# Patient Record
Sex: MALE
[De-identification: names, ages, dates, MRNs, and addresses within clinical notes are randomized per-mention and may not be internally consistent; named-entity substitution may affect disease eponyms.]

---

## 2024-08-19 PROBLEM — Z00.00 ENCOUNTER FOR PREVENTIVE HEALTH EXAMINATION: Status: ACTIVE | Noted: 2024-08-19

## 2024-08-20 ENCOUNTER — APPOINTMENT (OUTPATIENT)
Dept: SURGERY | Facility: CLINIC | Age: 24
End: 2024-08-20

## 2024-08-21 ENCOUNTER — APPOINTMENT (OUTPATIENT)
Dept: SURGERY | Facility: CLINIC | Age: 24
End: 2024-08-21
Payer: COMMERCIAL

## 2024-08-21 VITALS
DIASTOLIC BLOOD PRESSURE: 74 MMHG | SYSTOLIC BLOOD PRESSURE: 119 MMHG | BODY MASS INDEX: 21.19 KG/M2 | OXYGEN SATURATION: 98 % | HEIGHT: 67 IN | WEIGHT: 135 LBS | HEART RATE: 85 BPM

## 2024-08-21 DIAGNOSIS — Z78.9 OTHER SPECIFIED HEALTH STATUS: ICD-10-CM

## 2024-08-21 DIAGNOSIS — Z83.3 FAMILY HISTORY OF DIABETES MELLITUS: ICD-10-CM

## 2024-08-21 DIAGNOSIS — R10.9 UNSPECIFIED ABDOMINAL PAIN: ICD-10-CM

## 2024-08-21 DIAGNOSIS — K82.8 OTHER SPECIFIED DISEASES OF GALLBLADDER: ICD-10-CM

## 2024-08-21 DIAGNOSIS — Z82.49 FAMILY HISTORY OF ISCHEMIC HEART DISEASE AND OTHER DISEASES OF THE CIRCULATORY SYSTEM: ICD-10-CM

## 2024-08-21 PROCEDURE — 99204 OFFICE O/P NEW MOD 45 MIN: CPT

## 2024-08-21 RX ORDER — AMITRIPTYLINE HYDROCHLORIDE 75 MG/1
TABLET, FILM COATED ORAL
Refills: 0 | Status: ACTIVE | COMMUNITY

## 2024-08-21 NOTE — CONSULT LETTER
[Dear  ___] : Dear  [unfilled], [Consult Letter:] : I had the pleasure of evaluating your patient, [unfilled]. [Consult Closing:] : Thank you very much for allowing me to participate in the care of this patient.  If you have any questions, please do not hesitate to contact me. [Sincerely,] : Sincerely, [FreeTextEntry3] : Austyn Schofield MD Director, General Surgery

## 2024-08-21 NOTE — DATA REVIEWED
[FreeTextEntry1] : 11/20/23 @[facility] HEPATOBILIARY SCAN WITH GBEF [id number] IMPRESSION: 1.  Nondiagnostic study for sphincter of Oddi dysfunction due to (1) gallbladder activity obscuring the common bile duct and (2) presence of a gallbladder. 2.  Patent cystic duct.     FINDINGS: NM HEPATOBILIARY SCAN WITH GBEF Agent: 5.0 mCi Choletec, IV. 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   Technique: The patient was pre-treated with CCK per protocol.  The patient was subsequently injected with the above radiopharmaceutical.  Dynamic imaging of the liver was performed over 60 minutes. Comparison  None. Findings: Initial perfusion images of the liver are normal. Subsequent dynamic imaging demonstrates rapid clearance from the blood pool and brisk hepatic uptake. The gallbladder is visualized at 7 minutes and fills a normal fashion. Bowel activity is noted at 53 minutes. The gallbladder obscures visualization of the common bile duct.    US ABDOMEN COMPLETE: 4/4/2023 8:45 AM CLINICAL INFORMATION:  Abdominal pain not otherwise specified TECHNIQUE: Ultrasonography of the abdomen was performed using a linear array transducer.  COMPARISON:None  FINDINGS:   The liver measures 13.3cm in length. Hepatic contour is unremarkable. Hepatic echotexture is unremarkable, without evidence for focal lesion. The portal vein is patent with appropriate direction flow.  No intrahepatic biliary ductal dilatation.   The common duct is not dilated, measuring 3 mm.    No mobile gallstones or sludge . There is no gallbladder wall thickening. No pericholecystic collection.  No tenderness was elicited with direct compression by the transducer (no sonographic An's sign identified). The visualized portions of the pancreas appear unremarkable, allowing for the limited evaluation by the ultrasound technique.   The right kidney measures  9.3 cm in length. Cortical thickness and echogenicity are preserved. No  obstructing calculus. No solid mass or hydronephrosis. The left kidney measures  10.0 cm in length. Cortical thickness and echogenicity are preserved. No  obstructing calculus. No solid mass or hydronephrosis. The spleen measures approximately 9.0  cm. Echotexture is preserved. No focal mass.    XR ABDOMEN 1 VIEW : 4/4/2023 8:45 AM History: Abdominal pain. Technique: A supine, AP radiograph of the abdomen and pelvis was performed. Comparison: None available.  Findings: The bowel gas pattern is nonobstructive, given the limitations on this single supine view. There is a large amount of stool throughout the right, transverse, and left colon and stool and bowel gas in the distal colon. The osseous structures are unremarkable. Impression: Findings compatible with constipation.      Patient: REJI MUNOZ YOB: 2000 Phone: [phone]  MRN: [id number] Acc: [id number] Date of Exam: 12-   EXAM:  MRI ABDOMEN WITHOUT AND WITH CONTRAST AND MRI PELVIS WITHOUT AND WITH CONTRAST  HISTORY:  Right upper quadrant abdominal pain with bloating, negative upper endoscopy, rule out small bowel process  TECHNIQUE:  MRI enterography is attained utilizing 1.5 Sari magnet.  Before the examination, the patient drank 3 bottles of Breeza.  Axial and coronal true FISP imaging and axial and coronal HASTE with fat saturated imaging is provided.  Coronal HASTE without fat saturated and coronal VIBE fat-saturated imaging is also obtained. Then 1 mg of glucagon was administered.  Postcontrast VIBE 3-D is then acquired in coronal plane after the intravenous administration of contrast. 12 ml of Clariscan was injected from a 15 ml single use vial. Subtraction views are provided. COMPARISON:  CT abdomen and pelvis from 2021  FINDINGS: GASTROINTESTINAL: There is adequate fluid distention of the small bowel. There is no evidence for bowel wall thickening, mural hyperenhancement, or mural edema. There is no evidence for stricture or obstruction. No signs of penetrating disease large field-of-view imaging does not demonstrate perianal fistula or abscess. No discrete small bowel mass is visualized. The colon is grossly unremarkable. LOWER CHEST: Unremarkable. HEPATOBILIARY: No evidence for cholelithiasis or choledocholithiasis. No evidence for biliary irregularity of. SPLEEN: Unremarkable. ADRENAL GLANDS: Unremarkable. PANCREAS: No signs of acute or chronic pancreatitis. Normal caliber main pancreatic duct with GENITOURINARY: Unremarkable. PERITONEUM AND RETROPERITONEUM: No abnormal free fluid and no discrete collection. LYMPH NODES:  Unremarkable. VASCULATURE: Unremarkable. SOFT TISSUES: Unremarkable. BONES: Unremarkable. IMPRESSION: 1.  No active inflammatory small bowel disease. 2.  No stricture, penetrating disease, or perianal disease. 3.  No extraintestinal sequela of inflammatory bowel disease.  Patient: REJI MUNOZ YOB: 2000 Phone: [phone]  MRN: [id number] Acc: [id number] Date of Exam: 10-   EXAM:  CT ABDOMEN AND PELVIS WITH CONTRAST  HISTORY:  Weight loss. TECHNIQUE:  Helical scanning was performed from the diaphragm domes to the pubic symphysis with intravenous contrast. 100 cc of Omnipaque 350 was administered intravenously. One or more of the following dose reduction techniques were used: automated exposure control, adjustment of the mA and/or kV according to patient size, use of iterative reconstruction technique.  COMPARISON:  None.  FINDINGS: The lung bases are clear. The liver is normal in size without evidence of focal masses or intrahepatic biliary dilatation. There is no evidence of gallstone, wall thickening or pericholecystic fluid. The spleen is normal in size without evidence of mass. There is no evidence of pancreatic mass or ductal dilatation. No evidence of urinary tract calculus. Postcontrast imaging demonstrates the kidneys to enhance and excrete contrast symmetrically without evidence of solid focal masses. There is no evidence of hydronephrosis. There is no bowel related phlegmon, mural thickening or obstruction. There is no adrenal mass. There is no significant retroperitoneal lymphadenopathy. There is no aortic aneurysm. The urinary bladder demonstrates no evidence of mucosal abnormality or wall thickening. No pelvic lymphadenopathy, mass, or ascites is observed. No suspicious skeletal lesions are observed.  IMPRESSION:  1.  Normal appearance of the abdomen and pelvis.

## 2024-08-21 NOTE — HISTORY OF PRESENT ILLNESS
[de-identified] : Mr. MUNOZ is a 24 year y/o M referred (by Norma Azar) for consultation visit w cc of having epigastric abdominal pain and bloating, which is intermittent over the past 3 years.  Pt has been experiencing upper abdominal pain a/w nausea. He also notes pain, usually after eating his first meal of the day. Does not have pain after eating subsequent meals.  No vomiting. No prior history of hospitalizations. No fevers or chills. Pt has history of gastritis, and has taken PPI /antacids in the past, w no symptomatic relief. Pt avoids spicy foods/alcoholic beverages.    Patient has undergone an extensive workup as follows:   Studies/IMAGING Hx:  Pt had Abd US, 04/04/23; findings were within normal limits, etiology for symptoms not determined.  XRAY of Abdomen, 04/04/23: findings showed bowel gas pattern The bowel gas pattern is nonobstructive, given the limitations on this single supine view. There is a large amount of stool throughout the right, transverse, and left colon and stool and bowel gas in the distal colon. nonobstructive, given the limitations on this single supine view. There is a large amount of stool throughout the right, transverse, and left colon and stool and bowel gas in the distal colon.  HIDA with biliary EF (10/2023)-- findings showed gallbladder EF of 25%, below normal range. Findings suggestive of biliary dyskinesia.  HIDA SCAN with GBEF (11/23/23)- results c/w non-diagnostic study for sphincter of Oddi dysfunction due to gallbladder activity obscuring the common bile duct and presence of a gallbladder. Patent cystic duct.  12/08/23--MRI abdomen w/wo contrast; Pelvis; findings showed no active inflammatory bowel disease, no stricture, penetrating disease or perianal disease. No extraintestinal sequela of inflammatory bowel disease.   Pt also had an EGD done, 09/08/23 with Dr. Azar- which was c/w GERD with esophagitis. H.Pylori testing was negative.  04/2024- gastric emptying study was done with no evidence of gastroparesis.   Pt states he's been managing his symptoms with PO Nortriptyline/ Amytriptyline, as prescribed by Dr. Azar, which is helping alleviate discomfort by at least 70%.

## 2024-08-21 NOTE — PHYSICAL EXAM
[No Rash or Lesion] : No rash or lesion [Alert] : alert [Oriented to Person] : oriented to person [Oriented to Place] : oriented to place [Oriented to Time] : oriented to time [Calm] : calm [de-identified] : A/Ox3; NAD. appears comfortable [de-identified] : EOMI; sclera anicteric. [de-identified] : no cervical lymphadenopathy  [de-identified] : airway patent, no use of accessory muscles [de-identified] : Abd is soft, nondistended, no rebound or guarding. No abdominal masses. No abdominal tenderness.

## 2024-08-21 NOTE — DATA REVIEWED
[FreeTextEntry1] : 11/20/23 @[facility] HEPATOBILIARY SCAN WITH GBEF [id number] IMPRESSION: 1.  Nondiagnostic study for sphincter of Oddi dysfunction due to (1) gallbladder activity obscuring the common bile duct and (2) presence of a gallbladder. 2.  Patent cystic duct.     FINDINGS: NM HEPATOBILIARY SCAN WITH GBEF Agent: 5.0 mCi Choletec, IV. 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   Technique: The patient was pre-treated with CCK per protocol.  The patient was subsequently injected with the above radiopharmaceutical.  Dynamic imaging of the liver was performed over 60 minutes. Comparison  None. Findings: Initial perfusion images of the liver are normal. Subsequent dynamic imaging demonstrates rapid clearance from the blood pool and brisk hepatic uptake. The gallbladder is visualized at 7 minutes and fills a normal fashion. Bowel activity is noted at 53 minutes. The gallbladder obscures visualization of the common bile duct.    US ABDOMEN COMPLETE: 4/4/2023 8:45 AM CLINICAL INFORMATION:  Abdominal pain not otherwise specified TECHNIQUE: Ultrasonography of the abdomen was performed using a linear array transducer.  COMPARISON:None  FINDINGS:   The liver measures 13.3cm in length. Hepatic contour is unremarkable. Hepatic echotexture is unremarkable, without evidence for focal lesion. The portal vein is patent with appropriate direction flow.  No intrahepatic biliary ductal dilatation.   The common duct is not dilated, measuring 3 mm.    No mobile gallstones or sludge . There is no gallbladder wall thickening. No pericholecystic collection.  No tenderness was elicited with direct compression by the transducer (no sonographic An's sign identified). The visualized portions of the pancreas appear unremarkable, allowing for the limited evaluation by the ultrasound technique.   The right kidney measures  9.3 cm in length. Cortical thickness and echogenicity are preserved. No  obstructing calculus. No solid mass or hydronephrosis. The left kidney measures  10.0 cm in length. Cortical thickness and echogenicity are preserved. No  obstructing calculus. No solid mass or hydronephrosis. The spleen measures approximately 9.0  cm. Echotexture is preserved. No focal mass.    XR ABDOMEN 1 VIEW : 4/4/2023 8:45 AM History: Abdominal pain. Technique: A supine, AP radiograph of the abdomen and pelvis was performed. Comparison: None available.  Findings: The bowel gas pattern is nonobstructive, given the limitations on this single supine view. There is a large amount of stool throughout the right, transverse, and left colon and stool and bowel gas in the distal colon. The osseous structures are unremarkable. Impression: Findings compatible with constipation.      Patient: REJI MUNOZ YOB: 2000 Phone: [phone]  MRN: [id number] Acc: [id number] Date of Exam: 12-   EXAM:  MRI ABDOMEN WITHOUT AND WITH CONTRAST AND MRI PELVIS WITHOUT AND WITH CONTRAST  HISTORY:  Right upper quadrant abdominal pain with bloating, negative upper endoscopy, rule out small bowel process  TECHNIQUE:  MRI enterography is attained utilizing 1.5 Sari magnet.  Before the examination, the patient drank 3 bottles of Breeza.  Axial and coronal true FISP imaging and axial and coronal HASTE with fat saturated imaging is provided.  Coronal HASTE without fat saturated and coronal VIBE fat-saturated imaging is also obtained. Then 1 mg of glucagon was administered.  Postcontrast VIBE 3-D is then acquired in coronal plane after the intravenous administration of contrast. 12 ml of Clariscan was injected from a 15 ml single use vial. Subtraction views are provided. COMPARISON:  CT abdomen and pelvis from 2021  FINDINGS: GASTROINTESTINAL: There is adequate fluid distention of the small bowel. There is no evidence for bowel wall thickening, mural hyperenhancement, or mural edema. There is no evidence for stricture or obstruction. No signs of penetrating disease large field-of-view imaging does not demonstrate perianal fistula or abscess. No discrete small bowel mass is visualized. The colon is grossly unremarkable. LOWER CHEST: Unremarkable. HEPATOBILIARY: No evidence for cholelithiasis or choledocholithiasis. No evidence for biliary irregularity of. SPLEEN: Unremarkable. ADRENAL GLANDS: Unremarkable. PANCREAS: No signs of acute or chronic pancreatitis. Normal caliber main pancreatic duct with GENITOURINARY: Unremarkable. PERITONEUM AND RETROPERITONEUM: No abnormal free fluid and no discrete collection. LYMPH NODES:  Unremarkable. VASCULATURE: Unremarkable. SOFT TISSUES: Unremarkable. BONES: Unremarkable. IMPRESSION: 1.  No active inflammatory small bowel disease. 2.  No stricture, penetrating disease, or perianal disease. 3.  No extraintestinal sequela of inflammatory bowel disease.  Patient: REJI MUNOZ YOB: 2000 Phone: [phone]  MRN: [id number] Acc: [id number] Date of Exam: 10-   EXAM:  CT ABDOMEN AND PELVIS WITH CONTRAST  HISTORY:  Weight loss. TECHNIQUE:  Helical scanning was performed from the diaphragm domes to the pubic symphysis with intravenous contrast. 100 cc of Omnipaque 350 was administered intravenously. One or more of the following dose reduction techniques were used: automated exposure control, adjustment of the mA and/or kV according to patient size, use of iterative reconstruction technique.  COMPARISON:  None.  FINDINGS: The lung bases are clear. The liver is normal in size without evidence of focal masses or intrahepatic biliary dilatation. There is no evidence of gallstone, wall thickening or pericholecystic fluid. The spleen is normal in size without evidence of mass. There is no evidence of pancreatic mass or ductal dilatation. No evidence of urinary tract calculus. Postcontrast imaging demonstrates the kidneys to enhance and excrete contrast symmetrically without evidence of solid focal masses. There is no evidence of hydronephrosis. There is no bowel related phlegmon, mural thickening or obstruction. There is no adrenal mass. There is no significant retroperitoneal lymphadenopathy. There is no aortic aneurysm. The urinary bladder demonstrates no evidence of mucosal abnormality or wall thickening. No pelvic lymphadenopathy, mass, or ascites is observed. No suspicious skeletal lesions are observed.  IMPRESSION:  1.  Normal appearance of the abdomen and pelvis.

## 2024-08-21 NOTE — HISTORY OF PRESENT ILLNESS
[de-identified] : Mr. MUNOZ is a 24 year y/o M referred (by Norma Azar) for consultation visit w cc of having epigastric abdominal pain and bloating, which is intermittent over the past 3 years.  Pt has been experiencing upper abdominal pain a/w nausea. He also notes pain, usually after eating his first meal of the day. Does not have pain after eating subsequent meals.  No vomiting. No prior history of hospitalizations. No fevers or chills. Pt has history of gastritis, and has taken PPI /antacids in the past, w no symptomatic relief. Pt avoids spicy foods/alcoholic beverages.    Patient has undergone an extensive workup as follows:   Studies/IMAGING Hx:  Pt had Abd US, 04/04/23; findings were within normal limits, etiology for symptoms not determined.  XRAY of Abdomen, 04/04/23: findings showed bowel gas pattern The bowel gas pattern is nonobstructive, given the limitations on this single supine view. There is a large amount of stool throughout the right, transverse, and left colon and stool and bowel gas in the distal colon. nonobstructive, given the limitations on this single supine view. There is a large amount of stool throughout the right, transverse, and left colon and stool and bowel gas in the distal colon.  HIDA with biliary EF (10/2023)-- findings showed gallbladder EF of 25%, below normal range. Findings suggestive of biliary dyskinesia.  HIDA SCAN with GBEF (11/23/23)- results c/w non-diagnostic study for sphincter of Oddi dysfunction due to gallbladder activity obscuring the common bile duct and presence of a gallbladder. Patent cystic duct.  12/08/23--MRI abdomen w/wo contrast; Pelvis; findings showed no active inflammatory bowel disease, no stricture, penetrating disease or perianal disease. No extraintestinal sequela of inflammatory bowel disease.   Pt also had an EGD done, 09/08/23 with Dr. Azar- which was c/w GERD with esophagitis. H.Pylori testing was negative.  04/2024- gastric emptying study was done with no evidence of gastroparesis.   Pt states he's been managing his symptoms with PO Nortriptyline/ Amytriptyline, as prescribed by Dr. Azar, which is helping alleviate discomfort by at least 70%.

## 2024-08-21 NOTE — REVIEW OF SYSTEMS
[Abdominal Pain] : abdominal pain [Fever] : no fever [Chills] : no chills [Feeling Poorly] : not feeling poorly [Chest Pain] : no chest pain [Lower Ext Edema] : no extremity edema [Shortness Of Breath] : no shortness of breath [Cough] : no cough [As Noted in HPI] : as noted in HPI [Vomiting] : no vomiting [Arthralgias] : no arthralgias [Skin Lesions] : no skin lesions [Skin Wound] : no skin wound [Dizziness] : no dizziness [Anxiety] : no anxiety [Muscle Weakness] : no muscle weakness [Swollen Glands] : no swollen glands [Negative] : Heme/Lymph

## 2024-08-21 NOTE — DATA REVIEWED
[FreeTextEntry1] : 11/20/23 @[facility] HEPATOBILIARY SCAN WITH GBEF [id number] IMPRESSION: 1.  Nondiagnostic study for sphincter of Oddi dysfunction due to (1) gallbladder activity obscuring the common bile duct and (2) presence of a gallbladder. 2.  Patent cystic duct.     FINDINGS: NM HEPATOBILIARY SCAN WITH GBEF Agent: 5.0 mCi Choletec, IV. 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   Technique: The patient was pre-treated with CCK per protocol.  The patient was subsequently injected with the above radiopharmaceutical.  Dynamic imaging of the liver was performed over 60 minutes. Comparison  None. Findings: Initial perfusion images of the liver are normal. Subsequent dynamic imaging demonstrates rapid clearance from the blood pool and brisk hepatic uptake. The gallbladder is visualized at 7 minutes and fills a normal fashion. Bowel activity is noted at 53 minutes. The gallbladder obscures visualization of the common bile duct.    US ABDOMEN COMPLETE: 4/4/2023 8:45 AM CLINICAL INFORMATION:  Abdominal pain not otherwise specified TECHNIQUE: Ultrasonography of the abdomen was performed using a linear array transducer.  COMPARISON:None  FINDINGS:   The liver measures 13.3cm in length. Hepatic contour is unremarkable. Hepatic echotexture is unremarkable, without evidence for focal lesion. The portal vein is patent with appropriate direction flow.  No intrahepatic biliary ductal dilatation.   The common duct is not dilated, measuring 3 mm.    No mobile gallstones or sludge . There is no gallbladder wall thickening. No pericholecystic collection.  No tenderness was elicited with direct compression by the transducer (no sonographic An's sign identified). The visualized portions of the pancreas appear unremarkable, allowing for the limited evaluation by the ultrasound technique.   The right kidney measures  9.3 cm in length. Cortical thickness and echogenicity are preserved. No  obstructing calculus. No solid mass or hydronephrosis. The left kidney measures  10.0 cm in length. Cortical thickness and echogenicity are preserved. No  obstructing calculus. No solid mass or hydronephrosis. The spleen measures approximately 9.0  cm. Echotexture is preserved. No focal mass.    XR ABDOMEN 1 VIEW : 4/4/2023 8:45 AM History: Abdominal pain. Technique: A supine, AP radiograph of the abdomen and pelvis was performed. Comparison: None available.  Findings: The bowel gas pattern is nonobstructive, given the limitations on this single supine view. There is a large amount of stool throughout the right, transverse, and left colon and stool and bowel gas in the distal colon. The osseous structures are unremarkable. Impression: Findings compatible with constipation.      Patient: REJI MUNOZ YOB: 2000 Phone: [phone]  MRN: [id number] Acc: [id number] Date of Exam: 12-   EXAM:  MRI ABDOMEN WITHOUT AND WITH CONTRAST AND MRI PELVIS WITHOUT AND WITH CONTRAST  HISTORY:  Right upper quadrant abdominal pain with bloating, negative upper endoscopy, rule out small bowel process  TECHNIQUE:  MRI enterography is attained utilizing 1.5 Sari magnet.  Before the examination, the patient drank 3 bottles of Breeza.  Axial and coronal true FISP imaging and axial and coronal HASTE with fat saturated imaging is provided.  Coronal HASTE without fat saturated and coronal VIBE fat-saturated imaging is also obtained. Then 1 mg of glucagon was administered.  Postcontrast VIBE 3-D is then acquired in coronal plane after the intravenous administration of contrast. 12 ml of Clariscan was injected from a 15 ml single use vial. Subtraction views are provided. COMPARISON:  CT abdomen and pelvis from 2021  FINDINGS: GASTROINTESTINAL: There is adequate fluid distention of the small bowel. There is no evidence for bowel wall thickening, mural hyperenhancement, or mural edema. There is no evidence for stricture or obstruction. No signs of penetrating disease large field-of-view imaging does not demonstrate perianal fistula or abscess. No discrete small bowel mass is visualized. The colon is grossly unremarkable. LOWER CHEST: Unremarkable. HEPATOBILIARY: No evidence for cholelithiasis or choledocholithiasis. No evidence for biliary irregularity of. SPLEEN: Unremarkable. ADRENAL GLANDS: Unremarkable. PANCREAS: No signs of acute or chronic pancreatitis. Normal caliber main pancreatic duct with GENITOURINARY: Unremarkable. PERITONEUM AND RETROPERITONEUM: No abnormal free fluid and no discrete collection. LYMPH NODES:  Unremarkable. VASCULATURE: Unremarkable. SOFT TISSUES: Unremarkable. BONES: Unremarkable. IMPRESSION: 1.  No active inflammatory small bowel disease. 2.  No stricture, penetrating disease, or perianal disease. 3.  No extraintestinal sequela of inflammatory bowel disease.  Patient: REJI MUNOZ YOB: 2000 Phone: [phone]  MRN: [id number] Acc: [id number] Date of Exam: 10-   EXAM:  CT ABDOMEN AND PELVIS WITH CONTRAST  HISTORY:  Weight loss. TECHNIQUE:  Helical scanning was performed from the diaphragm domes to the pubic symphysis with intravenous contrast. 100 cc of Omnipaque 350 was administered intravenously. One or more of the following dose reduction techniques were used: automated exposure control, adjustment of the mA and/or kV according to patient size, use of iterative reconstruction technique.  COMPARISON:  None.  FINDINGS: The lung bases are clear. The liver is normal in size without evidence of focal masses or intrahepatic biliary dilatation. There is no evidence of gallstone, wall thickening or pericholecystic fluid. The spleen is normal in size without evidence of mass. There is no evidence of pancreatic mass or ductal dilatation. No evidence of urinary tract calculus. Postcontrast imaging demonstrates the kidneys to enhance and excrete contrast symmetrically without evidence of solid focal masses. There is no evidence of hydronephrosis. There is no bowel related phlegmon, mural thickening or obstruction. There is no adrenal mass. There is no significant retroperitoneal lymphadenopathy. There is no aortic aneurysm. The urinary bladder demonstrates no evidence of mucosal abnormality or wall thickening. No pelvic lymphadenopathy, mass, or ascites is observed. No suspicious skeletal lesions are observed.  IMPRESSION:  1.  Normal appearance of the abdomen and pelvis.

## 2024-08-21 NOTE — REASON FOR VISIT
[Consultation] : a consultation visit [Parent] : parent [Family Member] : family member [FreeTextEntry1] : biliary dyskinesia

## 2024-08-21 NOTE — PLAN
[FreeTextEntry1] : Mr. REJI MUNOZ was informed of significance of findings. All the options, risks and benefits were discussed at length. Cholecystectomy was offered and risks explained include the potential to go open, small potential for bleeding, CBD injury, bile leak, and other related complications. Informed consent for robotic assisted laparoscopic/possible open cholecystectomy and potential risks, benefits to the planned surgery were discussed in depth. Patient wishes to have repeat HIDA scan to re-asses GBEF prior to proceeding with surgical procedure.  Will follow up w patient to discuss results via televisit.   Patient advised to seek immediate medical attention with any acute change in symptoms or with the development of any new or worsening symptoms. Patient's questions and concerns addressed to patient's satisfaction, and patient verbalized an understanding of the information discussed.

## 2024-08-21 NOTE — ASSESSMENT
[FreeTextEntry1] : IMP: 25 yo M with intermittent upper abdominal pains; found to have biliary dyskinesia with gallbladder EF of 25% on HIDA scan.

## 2024-08-21 NOTE — PHYSICAL EXAM
[No Rash or Lesion] : No rash or lesion [Alert] : alert [Oriented to Person] : oriented to person [Oriented to Place] : oriented to place [Oriented to Time] : oriented to time [Calm] : calm [de-identified] : A/Ox3; NAD. appears comfortable [de-identified] : EOMI; sclera anicteric. [de-identified] : no cervical lymphadenopathy  [de-identified] : airway patent, no use of accessory muscles [de-identified] : Abd is soft, nondistended, no rebound or guarding. No abdominal masses. No abdominal tenderness.

## 2024-08-21 NOTE — HISTORY OF PRESENT ILLNESS
[de-identified] : Mr. MUNOZ is a 24 year y/o M referred (by Norma Azar) for consultation visit w cc of having epigastric abdominal pain and bloating, which is intermittent over the past 3 years.  Pt has been experiencing upper abdominal pain a/w nausea. He also notes pain, usually after eating his first meal of the day. Does not have pain after eating subsequent meals.  No vomiting. No prior history of hospitalizations. No fevers or chills. Pt has history of gastritis, and has taken PPI /antacids in the past, w no symptomatic relief. Pt avoids spicy foods/alcoholic beverages.    Patient has undergone an extensive workup as follows:   Studies/IMAGING Hx:  Pt had Abd US, 04/04/23; findings were within normal limits, etiology for symptoms not determined.  XRAY of Abdomen, 04/04/23: findings showed bowel gas pattern The bowel gas pattern is nonobstructive, given the limitations on this single supine view. There is a large amount of stool throughout the right, transverse, and left colon and stool and bowel gas in the distal colon. nonobstructive, given the limitations on this single supine view. There is a large amount of stool throughout the right, transverse, and left colon and stool and bowel gas in the distal colon.  HIDA with biliary EF (10/2023)-- findings showed gallbladder EF of 25%, below normal range. Findings suggestive of biliary dyskinesia.  HIDA SCAN with GBEF (11/23/23)- results c/w non-diagnostic study for sphincter of Oddi dysfunction due to gallbladder activity obscuring the common bile duct and presence of a gallbladder. Patent cystic duct.  12/08/23--MRI abdomen w/wo contrast; Pelvis; findings showed no active inflammatory bowel disease, no stricture, penetrating disease or perianal disease. No extraintestinal sequela of inflammatory bowel disease.   Pt also had an EGD done, 09/08/23 with Dr. Azar- which was c/w GERD with esophagitis. H.Pylori testing was negative.  04/2024- gastric emptying study was done with no evidence of gastroparesis.   Pt states he's been managing his symptoms with PO Nortriptyline/ Amytriptyline, as prescribed by Dr. Azar, which is helping alleviate discomfort by at least 70%.

## 2024-08-21 NOTE — PHYSICAL EXAM
[No Rash or Lesion] : No rash or lesion [Alert] : alert [Oriented to Person] : oriented to person [Oriented to Place] : oriented to place [Oriented to Time] : oriented to time [Calm] : calm [de-identified] : A/Ox3; NAD. appears comfortable [de-identified] : EOMI; sclera anicteric. [de-identified] : no cervical lymphadenopathy  [de-identified] : airway patent, no use of accessory muscles [de-identified] : Abd is soft, nondistended, no rebound or guarding. No abdominal masses. No abdominal tenderness.